# Patient Record
Sex: FEMALE | Race: WHITE | NOT HISPANIC OR LATINO
[De-identification: names, ages, dates, MRNs, and addresses within clinical notes are randomized per-mention and may not be internally consistent; named-entity substitution may affect disease eponyms.]

---
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
## 2022-09-26 NOTE — H&P PST PEDIATRIC - CENTRAL VENOUS CATHETER
No results found for: HGBA1C   -HgbA1C ordered  -Healthy diet and exercise changes as able  -F/u with RD no

## 2023-04-25 NOTE — H&P PST PEDIATRIC - HEART RATE (BEATS/MIN)
Alert-The patient is alert, awake and responds to voice. The patient is oriented to time, place, and person. The triage nurse is able to obtain subjective information. 84

## 2023-08-01 NOTE — ASU PREOP CHECKLIST - BOWEL PREP
n/a Complex Repair And Dermal Autograft Text: The defect edges were debeveled with a #15 scalpel blade.  The primary defect was closed partially with a complex linear closure.  Given the location of the defect, shape of the defect and the proximity to free margins an dermal autograft was deemed most appropriate to repair the remaining defect.  The graft was trimmed to fit the size of the remaining defect.  The graft was then placed in the primary defect, oriented appropriately, and sutured into place.

## 2024-08-09 NOTE — ED PEDIATRIC NURSE NOTE - NS ED NURSE LEVEL OF CONSCIOUSNESS SPEECH
MEDICAL ELIGIBILITY FORM    Name: Joanne Terrell YOB: 2012     Joanne is Medically eligible for all sports without restriction    Recommendations: N/A    I have examined the student named on this form and completed the preparticipation physical evaluation. The athlete does not have apparent clinical contraindications to practice and can participate in the sport(s) as outlined on this form. A copy of the physical examination findings are on record in my office and can be made available to the school at the request of the parents. If conditions arise after the athlete has been cleared for participation, the physician may rescind the medical eligibility until the problem is resolved and the potential consequences are completely explained to the athlete (and parents or guardians).    Name of health care professional (print or type): Anabel Nolen MD Date: 8/9/2024     Address: [facility] [address] [address]  Dept: [phone]     Signature of health care professional:       SHARED EMERGENCY INFORMATION    No Known Allergies    Current Outpatient Medications   Medication Instructions   • hydroCORTisone (CORTIZONE) 2.5 % ointment Topical, 2 TIMES DAILY   • pediatric multivitamin-iron (POLY-VI-SOL with IRON) 15 MG chewable tablet 1 tablet, Oral, DAILY       Other information:_____________________________________________________________________  _____________________________________________________________________________________  _____________________________________________________________________________________    Emergency contacts:___________________________________________________________________  _____________________________________________________________________________________  _____________________________________________________________________________________     © 2019 [place] [employer],  American Academy of Pediatrics, American College of Sport Medicine, American Medical Society for Sports Medicine, American Orthopaedics Society for Sport Medicine, and American Osteopathic Academy of Sports Medicine.  Permission is granted to reprint for noncommercial, educational purposes with acknowledgement.        PHYSICAL EXAMINATION FORM     Name: Joanne Terrell YOB: 2012   PHYSICIAN REMINDERS  1. Consider additional questions on more-sensitive issues.  Do you feel stressed out or under a lot of pressure?  Do you feel sad, hopeless, depressed or anxious?  Do you feel safe at your home or residence?  During the past 30 days, did you use chewing tobacco, snuff or dip?  Do you drink alcohol or user any other drugs?  Have you even taken anabolic steroids or used any other performance-enhancing supplement?  Have you even take any supplements to help you gain or lose weight or improve your performance?  Do you wear a seat belt, use a helmet, and use condoms?  2.  Consider reviewing questions on cardiovascular symptoms (Q4-Q13 of History Form).    EXAMINATION     Vitals: /75 (BP Location: LUE - Left upper extremity, Patient Position: Sitting, Cuff Size: Regular)   Pulse 87   Temp 97.7 °F (36.5 °C) (Temporal)   Resp 20   Ht 4' 10.27\" (1.48 m)   Wt 35 kg (77 lb 2.6 oz)   BMI 15.98 kg/m²   BSA 1.22 m²    Vision: R 20/               L 20/                      Corrected  Y         N     MEDICAL NORMAL ABNORMAL FINDINGS   Appearance  Marfan stigmata (kyphoscoliosis, high-arched palate, pectus excavatum, arachnodactyly, arm span > height, hyperlaxity, myopia, MVP, aortic insufficiency) Yes    Eyes, ears, nose, throat  Pupils equal  Hearing Yes    Lymph nodes Yes    Heart*  Murmurs (auscultation standing, auscultation supine, +/- Valsalva maneuver) Yes    Lungs Yes    Abdomen Yes    Skin  Herpes simplex virus (HSV), lesions suggestive of methicillin-resistant Staphylococcus aureus MRSA, or tinea  corporis Yes    MUSCULOSKELETAL NORMAL ABNORMAL FINDINGS   Neck Yes    Back Yes    Shoulder and arm Yes    Elbow and forearm Yes    Wrist, hand, and fingers Yes    Hip and thigh Yes    Knee Yes    Leg and ankle Yes    Foot and toes Yes    Functional  Double-leg squat test, single-leg squat test, and box drop or step drop test Yes    * Consider electrocardiography ECG, echocardiogram, referral to cardiology for abnormal cardiac history or examination finding, or a combination of those.  Name of health care professional (print or type): Anabel Nolen MD  Date: 8/9/2024  Address: [facility] [address] [address]  Dept: [phone]   Signature of health care professional:    © 2019 American Academy of Family Physicians, American Academy of Pediatrics, American College of Sport Medicine, American Medical Society for Sports Medicine, American Orthopaedics Society for Sport Medicine, and American Osteopathic Academy of Sports Medicine.  Permission is granted to reprint for noncommercial, educational purposes with acknowledgement.         HISTORY FORM  Note: Complete and sign this form (with your parents if younger than 18) before your appointment.  Name: Joanne Terrell YOB: 2012     Date of examination: 8/9/2024  Sport(s):_________________________________________   Sex assigned at birth: (F, M, or other): female How do you identify your gender?(F, M, or other):_________     List past and current medical conditions:____________________________________________________________________  _______________________________________________________________________________________________________________    Have you ever had surgery? If yes, list all past surgical procedures:  ______________________________________________  _______________________________________________________________________________________________________________    Medicines and supplements: List all current prescriptions, over-the-counter medicines, and supplements (herbal and nutritional):   ______________________________________________________________________________________________________________  ______________________________________________________________________________________________________________    Do you have any allergies? If yes, please list all your allergies (ie, medicines, pollens, food, stinging insects).   ______________________________________________________________________________________________________________  ______________________________________________________________________________________________________________       Patient Health Questionnaire Version 4 (PHQ-4)  Over the last 2 weeks, how often have you been bothered by any of the following problems? (Hughes response.)   Not at all Several days Over half the days Nearly every day   Feeling nervous, anxious, or on edge 0 1 2 3   Not being able to stop or control worrying 0 1 2 3   Little interest or pleasure in doing things 0 1 2 3   Feeling down, depressed, or hopeless 0 1 2 3   (A sum of =3 is considered positive on either subscale [questions 1 and 2, or questions 3 and 4] for screening purposes.)     GENERAL QUESTIONS  (Explain “Yes” answers at the end of this form.  Hughes questions if you don’t know the answer.)     Yes     No   1. Do you have any concerns that you would like to discuss with your provider?       2. Has a provider ever denied or restricted your participation in sports for any reason?       3. Do you have any ongoing medical issues or recent illness?       HEART HEALTH QUESTIONS ABOUT YOU Yes No   4. Have you ever passed out or nearly passed out during or after exercise?       5. Have you ever had  discomfort, pain, tightness, or pressure in your chest during exercise?       6. Does your heart ever race, flutter in your chest, or skip beats (irregular beats) during exercise?       7. Has a doctor ever told you that you have any heart problems?       8. Has a doctor ever requested a test for your heart? For example, electrocardiography (ECG) or echocardiography.      HEART HEALTH QUESTIONS ABOUT YOU (CONTINUED ) Yes No   9. Do you get light-headed or feel shorter of breath than your friends during exercise?       10. Have you ever had a seizure?       HEART HEALTH QUESTIONS ABOUT YOUR FAMILY Yes No   11. Has any family member or relative  of heart problems or had an unexpected or unexplained sudden death before age 35 years (including drowning or unexplained car crash)?       12. Does anyone in your family have a genetic heart problem such as hypertrophic cardiomyopathy (HCM), Marfan syndrome, arrhythmogenic right ventricular cardiomyopathy (ARVC), long QT syndrome (LQTS), short QT syndrome (SQTS), Brugada syndrome, or catecholaminergic polymorphic ventricular tachycardia (CPVT)?       13. Has anyone in your family had a pacemaker or an implanted defibrillator before age 35?                Name: Joanne Terrell YOB: 2012     BONE AND JOINT QUESTIONS Yes No   14. Have you ever had a stress fracture or an injury to a bone, muscle, ligament, joint, or tendon that caused you to miss a practice or game?       15. Do you have a bone, muscle, ligament, or joint injury that bothers you?       MEDICAL QUESTIONS Yes No   16. Do you cough, wheeze, or have difficulty breathing during or after exercise?       17. Are you missing a kidney, an eye, a testicle (males), your spleen, or any other organ?       18. Do you have groin or testicle pain or a painful bulge or hernia in the groin area?       19. Do you have any recurring skin rashes or rashes that come and go, including herpes or methicillin-resistant  Staphylococcus aureus  (MRSA)?       20. Have you had a concussion or head injury that caused confusion, a prolonged headache, or memory problems?       21. Have you ever had numbness, had tingling, had weakness in your arms or legs, or been unable to move your arms or legs after being hit or falling?       22. Have you ever become ill while exercising in the heat?       23. Do you or does someone in your family have sickle cell trait or disease?       24. Have you ever had or do you have any problems with your eyes or vision?        MEDICAL QUESTIONS (CONTINUED ) Yes No   25. Do you worry about your weight?         26. Are you trying to or has anyone recommended that you gain or lose weight?       27. Are you on a special diet or do you avoid certain types of foods or food groups?       28. Have you ever had an eating disorder?       FEMALES ONLY     29. Have you ever had a menstrual period?       30. How old were you when you had your first menstrual period?       31. When was your most recent menstrual period?       32. How many periods have you had in the past 12 months?         Explain \"Yes\" answers here.  ______________________________________________    ______________________________________________    ______________________________________________    ______________________________________________    ______________________________________________    ______________________________________________    ______________________________________________    ______________________________________________     I hereby state that, to the best of my knowledge, my answers to the questions on this form are complete and correct.    Signature of athlete: ____________________________________________________________________________________    Signature of parent or guardian: ___________________________________________________________________________  Date:  ________________________________________________  _____________________________________________________________________________________________________  © 2019 American Academy of Family Physicians, American Academy of Pediatrics, American College of Sports Medicine, American Medical Society for Sports Medicine, American Orthopaedic Society for Sports Medicine, and American Osteopathic Academy of Sports Medicine. Permission is granted to reprint for noncommercial, educational purposes with acknowledgment.   Age appropriate

## 2025-03-25 NOTE — DISCHARGE NOTE PEDIATRIC - COMPLETE THE FOLLOWING IF THE PATIENT REFUSES THE INFLUENZA VACCINE:
Nephrology managing dialysis   Risks/benefits discussed with the parent(s)/legal guardian/emancipated minor

## 2025-09-04 ENCOUNTER — NON-APPOINTMENT (OUTPATIENT)
Age: 25
End: 2025-09-04